# Patient Record
Sex: MALE | Race: WHITE | NOT HISPANIC OR LATINO | Employment: OTHER | URBAN - METROPOLITAN AREA
[De-identification: names, ages, dates, MRNs, and addresses within clinical notes are randomized per-mention and may not be internally consistent; named-entity substitution may affect disease eponyms.]

---

## 2024-10-22 DIAGNOSIS — Z76.89 ESTABLISHING CARE WITH NEW DOCTOR, ENCOUNTER FOR: Primary | ICD-10-CM

## 2024-10-23 ENCOUNTER — OFFICE VISIT (OUTPATIENT)
Dept: CARDIOTHORACIC SURGERY | Facility: CLINIC | Age: 69
End: 2024-10-23
Payer: MEDICARE

## 2024-10-23 VITALS
OXYGEN SATURATION: 94 % | HEART RATE: 96 BPM | BODY MASS INDEX: 24.5 KG/M2 | HEIGHT: 69 IN | RESPIRATION RATE: 18 BRPM | WEIGHT: 165.38 LBS

## 2024-10-23 DIAGNOSIS — Z76.89 ESTABLISHING CARE WITH NEW DOCTOR, ENCOUNTER FOR: ICD-10-CM

## 2024-10-23 DIAGNOSIS — I25.10 CORONARY ARTERY DISEASE INVOLVING NATIVE CORONARY ARTERY OF NATIVE HEART, UNSPECIFIED WHETHER ANGINA PRESENT: Primary | ICD-10-CM

## 2024-10-23 DIAGNOSIS — I25.118 CORONARY ARTERY DISEASE OF NATIVE ARTERY OF NATIVE HEART WITH STABLE ANGINA PECTORIS: ICD-10-CM

## 2024-10-23 DIAGNOSIS — I10 ESSENTIAL HYPERTENSION, BENIGN: ICD-10-CM

## 2024-10-23 DIAGNOSIS — E78.49 OTHER HYPERLIPIDEMIA: ICD-10-CM

## 2024-10-23 RX ORDER — CILOSTAZOL 100 MG/1
100 TABLET ORAL 2 TIMES DAILY
COMMUNITY
Start: 2024-09-26

## 2024-10-23 RX ORDER — ACETAMINOPHEN 500 MG
TABLET ORAL DAILY
COMMUNITY

## 2024-10-23 RX ORDER — MULTIVITAMIN
1 TABLET ORAL DAILY
COMMUNITY

## 2024-10-23 RX ORDER — NIACIN 500 MG
500 CAPSULE, EXTENDED RELEASE ORAL NIGHTLY
COMMUNITY

## 2024-10-23 RX ORDER — METOPROLOL SUCCINATE 25 MG/1
25 TABLET, EXTENDED RELEASE ORAL NIGHTLY
COMMUNITY
Start: 2024-09-26

## 2024-10-23 RX ORDER — NITROGLYCERIN 0.4 MG/1
TABLET SUBLINGUAL
COMMUNITY
Start: 2024-09-26

## 2024-10-23 RX ORDER — ASPIRIN 81 MG/1
81 TABLET ORAL
COMMUNITY
Start: 2024-09-26

## 2024-10-23 RX ORDER — UBIDECARENONE 30 MG
30 CAPSULE ORAL 3 TIMES DAILY
COMMUNITY

## 2024-10-24 NOTE — PROGRESS NOTES
Subjective:      Patient ID: Willie Welsh is a 69 y.o. male who presents for evaluation of aortic aneurysm.      Chief Complaint: New Pt and CABG consult      69-year-old gentleman with past medical history significant for coronary artery disease status post multiple PCIs, carotid stenosis status post left carotid endarterectomy, hypertension, hyperlipidemia, chronic leukocytosis, smoker, severe PAD present for surgical evaluation of coronary artery disease patient recently had left heart catheterization he was referred for surgical evaluation.  She has been intolerant to statins per his report.  Recently started on Nexletol.  He denies any chest pain or shortness of breath.  He does endorse decreased exercise tolerance as it relates to significant leg pain on ambulation.    Left heart catheterization 09/26/2024 4 vessel coronary artery disease, chronic total occlusion of the proximal RCA, moderate to severe stenosis of the mid left main with area greater than 6 mm, diffuse disease in the LAD with severe stenosis.  Moderate branch disease in the circumflex with a chronic total occlusion of the AV groove.  Mild elevated LVEDP.  Diffuse multilevel obstructive PID.  Patent left external artery stent.  Severe stenosis of the right common femoral artery with a long chronic total occlusion of the right SFA.  Short chronic total occlusion of left SFA.    Twelve lead EKG normal sinus rhythm 07/30/2024    CTA angio abdomen with runoff 08/26/2024 overall severe atherosclerotic disease, severe stenosis of the origin of the celiac trunk, moderate right and mild left renal artery stenosis, severe stenosis of the right common femoral artery, with severe stenosis of the origin of the right deep femoral artery, right SFA artery is occluded at its origin, foci moderate to severe stenosis of the right popliteal artery, limited evaluation of right calf arteries, there are 2 left external iliac artery stents which appear patent,  there is short segment occlusion of the left SFA with recon, occlusion of the left anterior tibial artery with two-vessel runoff to the left foot, three-vessel runoff to the right foot,    Carotid ultrasound 08/05/2024 50-69% stenosis of both ICAs    HPI  Review of Systems   Constitutional: Negative for chills and fever.   HENT:  Negative for congestion and hoarse voice.    Eyes:  Negative for blurred vision and double vision.   Cardiovascular:  Positive for claudication. Negative for chest pain, near-syncope and palpitations.   Respiratory:  Negative for cough and snoring.    Skin:  Negative for dry skin and poor wound healing.   Musculoskeletal:  Negative for back pain and joint pain.   Gastrointestinal:  Negative for abdominal pain, constipation and diarrhea.   Genitourinary:  Negative for frequency and urgency.   Neurological:  Negative for dizziness, focal weakness and weakness.      Past Medical History:   Diagnosis Date    Anemia, unspecified     CAD (coronary artery disease)     Carotid artery stenosis     s/p L CEA    HLD (hyperlipidemia)     HTN (hypertension)     Leukocytosis     Sciatica       Past Surgical History:   Procedure Laterality Date    CAROTID ENDARTERECTOMY Left 2011    [facility]    LEFT HEART CATHETERIZATION  07/2012    with PCI of circumflex (Dr. Melchor-[facility])    LEFT HEART CATHETERIZATION  09/26/2024    Dr. Hardy    LUMBAR LAMINECTOMY FOR DECOMPRESSION      L4-L5      Family History   Problem Relation Name Age of Onset    Heart disease Mother      Other (h/o CABG) Mother      Heart disease Sister      Other (h/o CABG x4) Sister      Heart attack Maternal Grandfather      Heart disease Maternal Grandfather        Social History     Socioeconomic History    Marital status:    Tobacco Use    Smoking status: Every Day     Types: Cigarettes    Smokeless tobacco: Never   Substance and Sexual Activity    Alcohol use: Yes        Medication List with Changes/Refills   Current Medications     "ASPIRIN (ECOTRIN) 81 MG EC TABLET    Take 81 mg by mouth.    CHOLECALCIFEROL, VITAMIN D3, (VITAMIN D3) 50 MCG (2,000 UNIT) CAP CAPSULE    Take by mouth once daily.    CILOSTAZOL (PLETAL) 100 MG TAB    Take 100 mg by mouth 2 (two) times daily.    CO-ENZYME Q-10 30 MG CAPSULE    Take 30 mg by mouth 3 (three) times daily.    METOPROLOL SUCCINATE (TOPROL-XL) 25 MG 24 HR TABLET    Take 25 mg by mouth every evening.    MULTIVITAMIN (ONE DAILY MULTIVITAMIN) PER TABLET    Take 1 tablet by mouth once daily.    NIACIN 500 MG CPSR    Take 500 mg by mouth every evening.    NITROGLYCERIN (NITROSTAT) 0.4 MG SL TABLET    DISSOLVE UNDER TONGUE EVERY 5 MINUTES AS NEEDED FOR CHEST PAIN    POTASSIUM ORAL    Take by mouth.        Objective:     Pulse 96   Resp 18   Ht 5' 9" (1.753 m)   Wt 75 kg (165 lb 6.4 oz)   SpO2 (!) 94%   BMI 24.43 kg/m²     Physical Exam  Constitutional:       Appearance: Normal appearance.   HENT:      Head: Normocephalic.      Nose: Nose normal.      Mouth/Throat:      Mouth: Mucous membranes are moist.   Eyes:      Pupils: Pupils are equal, round, and reactive to light.   Cardiovascular:      Rate and Rhythm: Normal rate and regular rhythm.      Heart sounds: Normal heart sounds.   Pulmonary:      Effort: Pulmonary effort is normal.   Abdominal:      General: Abdomen is flat.      Palpations: Abdomen is soft.   Musculoskeletal:         General: Normal range of motion.      Cervical back: Normal range of motion.   Skin:     Comments: [place] appearing lower extremities   Neurological:      General: No focal deficit present.      Mental Status: He is alert and oriented to person, place, and time.   Psychiatric:         Mood and Affect: Mood normal.        Labs:  No results found for: "NA", "K", "CL", "CO2", "GLU", "BUN", "CREATININE", "CALCIUM", "PROT", "ALBUMIN", "BILITOT", "ALKPHOS", "AST", "ALT", "ANIONGAP", "ESTGFRAFRICA", "EGFRNONAA"   No results found for: "ALT", "AST", "GGT", "ALKPHOS", "BILITOT"   No " "results found for: "PT"  No results found for: "WBC", "HGB", "HCT", "MCV", "LABPLAT"    No results found for: "BLOODTYPE"    Imaging:  AAA U/S NA    No image results found.     No image results found.           Assessment & Plan:     Severe multivessel coronary artery disease status post multiple PCIs  Severe PAD  Bilateral carotid stenosis status post left carotid endarterectomy  Hypertension/hyperlipidemia  Tobacco abuse  Chronically leukocytosis    10/24/2024 discussed lifestyle modification including smoking cessation for consideration of bypass surgery.  Get TTE that was performed at Dr. Hardy the office.  Preoperative tests required would be CT chest, PFT with DLCO .  Follow up with the results to consider surgical revascularization.  We will discuss with Dr. Hardy as well       Dr. Ney Valenzuela  Cardiothoracic Surgery    Seen and examined Gt Pang NP     I have reviewed and concur with the NP's history, physical, assessment, and plan.  I have personally interviewed and examined the patient at bedside.  See below addendum for my evaluation and additional findings. All questions were answered, risks and benefits were explained and pt agree to proceed with CABg and preop in progress.    "

## 2024-11-19 PROBLEM — I25.118 CORONARY ARTERY DISEASE OF NATIVE ARTERY OF NATIVE HEART WITH STABLE ANGINA PECTORIS: Status: ACTIVE | Noted: 2024-11-19

## 2025-01-28 ENCOUNTER — DOCUMENTATION ONLY (OUTPATIENT)
Dept: CARDIOTHORACIC SURGERY | Facility: CLINIC | Age: 70
End: 2025-01-28
Payer: MEDICARE

## 2025-01-28 NOTE — PROGRESS NOTES
Called to discuss F/U with pending test. Required test have not been performed. Patient did not answer. Left voicemail